# Patient Record
Sex: FEMALE | URBAN - METROPOLITAN AREA
[De-identification: names, ages, dates, MRNs, and addresses within clinical notes are randomized per-mention and may not be internally consistent; named-entity substitution may affect disease eponyms.]

---

## 2023-01-17 ENCOUNTER — OFFICE VISIT (OUTPATIENT)
Dept: OBGYN CLINIC | Age: 33
End: 2023-01-17
Payer: MEDICAID

## 2023-01-17 ENCOUNTER — HOSPITAL ENCOUNTER (OUTPATIENT)
Age: 33
Setting detail: SPECIMEN
Discharge: HOME | End: 2023-01-17

## 2023-01-17 VITALS
SYSTOLIC BLOOD PRESSURE: 122 MMHG | WEIGHT: 241 LBS | DIASTOLIC BLOOD PRESSURE: 78 MMHG | HEIGHT: 62 IN | BODY MASS INDEX: 44.35 KG/M2

## 2023-01-17 DIAGNOSIS — Z01.419 ENCOUNTER FOR GYNECOLOGICAL EXAMINATION WITHOUT ABNORMAL FINDING: Primary | ICD-10-CM

## 2023-01-17 PROCEDURE — 99385 PREV VISIT NEW AGE 18-39: CPT | Performed by: OBSTETRICS & GYNECOLOGY

## 2023-01-17 NOTE — PATIENT INSTRUCTIONS
We will contact you with the results of today's Pap smear. Return to the office in 1 year or as needed. Good luck and have a great year!

## 2023-01-17 NOTE — PROGRESS NOTES
[address] [facility]  [facility] Robbie Bolden  [address] [place] [id number]       DATE OF VISIT:  23        History and Physical    Vani Wu    :  1990  CHIEF COMPLAINT:    Chief Complaint   Patient presents with    Established New Doctor     New patient here for annual last pap 2017 no history of abnormal  having been before period and during intercourse wants cultures done vag discharge                     HPI :   Lisa Rubio is a 34 y.o. female. Nulligravida    PCP: No primary care provider on file. Lisa Rubio comes to the office today as a new visit self-referral.  She is a new transplant from [facility] to establish GYN care. She is due for her annual exam.  She is having menstrual cycles approximately 28 days apart with 4 to 5 days of flow. Flow is variable as it is are cramping. Currently she is not sexually active and has never been on contraception. She is having regular bowel movements without constipation or diarrhea. She denies any UTI symptoms or involuntary loss of urine. She is otherwise without any significant complaints today.  _____________________________________________________________________  History reviewed. No pertinent past medical history. History reviewed. No pertinent surgical history. Family History   Problem Relation Age of Onset    Cancer Paternal Grandfather         lung    Diabetes Maternal Grandmother     Hypertension Mother      Social History     Tobacco Use   Smoking Status Former    Types: Cigarettes    Quit date: 2022    Years since quittin.1   Smokeless Tobacco Not on file     Social History     Substance and Sexual Activity   Alcohol Use None     No current outpatient medications on file. No current facility-administered medications for this visit.        Allergies:  No Known Allergies    Gynecologic History:  Patient's last menstrual period was 2022. Sexually Active: No  STD History: Yes , HSV  Abnormal Pap History no  Birth Control: No    OB History    Para Term  AB Living   0 0 0 0 0 0   SAB IAB Ectopic Molar Multiple Live Births   0 0 0 0 0 0     ______________________________________________________________________  REVIEW OF SYSTEMS:        Constitutional:  Unexpected weight change no  Neurological:  Frequent headaches  no  Ophthalmic:  Recent visual changes no  ENT:   Difficulty swallowing  no  Breast:              Masses   no     Respiratory:  Shortness of breath  no    Cardiovascular: Chest pain   no     Gastrointestinal: Chronic diarrhea/constipation no   Urogenital:  Urinary incontinence  no                                         Heavy/irregular periods           no                                      Vaginal discharge                   no  Hematological: Bruises easy   no     Endocrine:  Nipple Discharge  no     Hot/Cold Intolerance  no   Psychological:  Mood and affect were wnl yes                                                                                                                                           Physical Exam:    Vitals:    23 1305   BP: 122/78   Site: Right Upper Arm   Position: Sitting   Cuff Size: Medium Adult   Weight: 241 lb (109.3 kg)   Height: 5' 2\" (1.575 m)       General Appearance:  She does not appear to be in any distress. This  is a well developed, well nourished, well groomed female. Neurological:  The patient is alert and oriented to time, place, person, and situation without any noted sensory motor deficits. Skin:  A brief inspection of the skin revealed no rashes or lesions. Neck:  The neck was supple. There is no tracheal deviation, thyromegaly or supraclavicular adenopathy appreciated.     Breast:   The patients breasts were symmetrical.  Breasts are nontender and there  were no masses, discharge or pathologic skin changes. There is no supraclavicular or axillary adenopathy bilaterally. Respiratory: There was unlabored respiratory effort. Lungs clear to ascultation without wheezes, rales or rhonchi in all fields bilaterally. Cardiovascular:  Normal sinus rhythm with a regular rate and without murmur, rubs or gallops. Abdomen: The abdomen was soft and non-tender with no guarding, rebound, CVAT or rigidity. No hernias were appreciated. Bowel sounds were normally active. Pelvic exam:  No vulvar, vaginal or cervical lesions are noted. Normal vaginal discharge present, no significant cystocele, rectocele or enterocele noted. Uterus nongravid and without CMT and adnexa nontender and without abnormal masses bilaterally. Rectum without external lesions noted. Extremities:  FROM and nontender without clubbing cyanosis or edema. ASSESSMENT:         ICD-10-CM    1. Encounter for gynecological examination without abnormal finding  Z01.419                       PLAN:  If Negative Cytology, Follow-up screening per current guidelines. Mammograms every 1year. If 36 yo and last mammogram was negative. Mixed incontinence - discussed bladder training and kegel exercises. Info given. Calcium and Vitamin D dosing reviewed. Colonoscopy screening reviewed as well as onset for bone density testing. Birth control and barrier recommendations discussed. STD counseling and prevention reviewed. Routine health maintenance per patients PCP. Return to the office in 1 year or when necessary  Patient was seen with total face to face time of 20 minutes. Marissa Mora M.D., [facility] ,[address] [facility]. [facility]  Melissa